# Patient Record
Sex: FEMALE | Race: WHITE | Employment: FULL TIME | URBAN - METROPOLITAN AREA
[De-identification: names, ages, dates, MRNs, and addresses within clinical notes are randomized per-mention and may not be internally consistent; named-entity substitution may affect disease eponyms.]

---

## 2017-06-26 ENCOUNTER — APPOINTMENT (OUTPATIENT)
Dept: LAB | Age: 52
End: 2017-06-26
Attending: INTERNAL MEDICINE
Payer: COMMERCIAL

## 2017-06-26 PROCEDURE — 80053 COMPREHEN METABOLIC PANEL: CPT | Performed by: INTERNAL MEDICINE

## 2017-06-26 PROCEDURE — 36415 COLL VENOUS BLD VENIPUNCTURE: CPT | Performed by: INTERNAL MEDICINE

## 2017-06-26 PROCEDURE — 83615 LACTATE (LD) (LDH) ENZYME: CPT | Performed by: INTERNAL MEDICINE

## 2017-06-26 PROCEDURE — 85025 COMPLETE CBC W/AUTO DIFF WBC: CPT | Performed by: INTERNAL MEDICINE

## 2017-06-29 ENCOUNTER — OFFICE VISIT (OUTPATIENT)
Dept: HEMATOLOGY/ONCOLOGY | Facility: HOSPITAL | Age: 52
End: 2017-06-29
Attending: INTERNAL MEDICINE
Payer: COMMERCIAL

## 2017-06-29 VITALS
SYSTOLIC BLOOD PRESSURE: 102 MMHG | HEIGHT: 62 IN | TEMPERATURE: 99 F | HEART RATE: 92 BPM | RESPIRATION RATE: 16 BRPM | WEIGHT: 172 LBS | BODY MASS INDEX: 31.65 KG/M2 | DIASTOLIC BLOOD PRESSURE: 46 MMHG

## 2017-06-29 DIAGNOSIS — C81.90 HODGKIN'S DISEASE, STAGE IIA (HCC): Primary | ICD-10-CM

## 2017-06-29 PROCEDURE — 99213 OFFICE O/P EST LOW 20 MIN: CPT | Performed by: INTERNAL MEDICINE

## 2017-06-29 PROCEDURE — 99212 OFFICE O/P EST SF 10 MIN: CPT | Performed by: INTERNAL MEDICINE

## 2017-06-29 RX ORDER — ROSUVASTATIN CALCIUM 10 MG/1
10 TABLET, COATED ORAL NIGHTLY
COMMUNITY

## 2017-06-29 NOTE — PROGRESS NOTES
GAMALIEL   Yaz Perez is a 47year old female here for f/u of Hodgkin's disease, stage iia (hcc)  (primary encounter diagnosis)      Pt here for follow up. States she feels good. Weight loss of 7 lbs. Denies pain or discomfort.      Denies B s daily. Disp:  Rfl: 3     Allergies:   No Known Allergies    Past Medical History:   Diagnosis Date   • High cholesterol    • History of pregnancy        • Hodgkin's lymphoma ([facility] [address]      Past Surgical History:  No date:     Social History  S murmur heard. Pulmonary/Chest: Effort normal and breath sounds normal. No respiratory distress. She has no wheezes. Port not tender at L chest.    Abdominal: Soft. Bowel sounds are normal. She exhibits no distension and no mass. There is no tenderness. per PCP. No orders of the defined types were placed in this encounter. Results From Past 48 Hours:  No results found for this or any previous visit (from the past 48 hour(s)).      Component      Latest Ref Rng & Units 6/26/2017 12/29/2016   Glucose 143 146       Imaging & Referrals:  None   No orders of the defined types were placed in this encounter.

## 2017-12-26 ENCOUNTER — LAB ENCOUNTER (OUTPATIENT)
Dept: LAB | Age: 52
End: 2017-12-26
Attending: INTERNAL MEDICINE
Payer: COMMERCIAL

## 2017-12-26 DIAGNOSIS — C81.90 HODGKIN'S DISEASE, STAGE IIA (HCC): ICD-10-CM

## 2017-12-26 PROCEDURE — 36415 COLL VENOUS BLD VENIPUNCTURE: CPT

## 2017-12-26 PROCEDURE — 83615 LACTATE (LD) (LDH) ENZYME: CPT

## 2017-12-26 PROCEDURE — 85025 COMPLETE CBC W/AUTO DIFF WBC: CPT

## 2017-12-26 PROCEDURE — 85652 RBC SED RATE AUTOMATED: CPT

## 2017-12-26 PROCEDURE — 80053 COMPREHEN METABOLIC PANEL: CPT

## 2017-12-28 ENCOUNTER — OFFICE VISIT (OUTPATIENT)
Dept: HEMATOLOGY/ONCOLOGY | Facility: HOSPITAL | Age: 52
End: 2017-12-28
Attending: INTERNAL MEDICINE
Payer: COMMERCIAL

## 2017-12-28 VITALS
HEART RATE: 80 BPM | RESPIRATION RATE: 16 BRPM | DIASTOLIC BLOOD PRESSURE: 67 MMHG | WEIGHT: 171 LBS | BODY MASS INDEX: 31.47 KG/M2 | TEMPERATURE: 97 F | HEIGHT: 62 IN | SYSTOLIC BLOOD PRESSURE: 127 MMHG

## 2017-12-28 DIAGNOSIS — C81.90 HODGKIN'S DISEASE, STAGE IIA (HCC): Primary | ICD-10-CM

## 2017-12-28 PROCEDURE — 99212 OFFICE O/P EST SF 10 MIN: CPT | Performed by: INTERNAL MEDICINE

## 2017-12-28 PROCEDURE — 99214 OFFICE O/P EST MOD 30 MIN: CPT | Performed by: INTERNAL MEDICINE

## 2017-12-28 NOTE — PROGRESS NOTES
HPI     Wicho Harrington is a 47year old female here for f/u of Hodgkin's disease, stage iia (hcc)  (primary encounter diagnosis)      Pt here for follow up. States she feels good. Weight stable. Denies pain or discomfort.      Denies B sy depression           Current Outpatient Prescriptions:  Rosuvastatin Calcium 10 MG Oral Tab Take 10 mg by mouth nightly. Disp:  Rfl:    SYNTHROID 88 MCG Oral Tab Take 88 mcg by mouth once daily.  Disp:  Rfl: 3     Allergies:   No Known Allergies    Past Med reactive to light. No scleral icterus. Neck: Normal range of motion. Neck supple. No tracheal deviation present. No thyromegaly present. Cardiovascular: Normal rate, regular rhythm and normal heart sounds. No murmur heard.   Pulmonary/Chest: Effort n once during the first 12 months, then clinically indicated.  Done June 2016 with CARMINE. Current TSH elevated, recommend evaluation by PCP for management of hypothyroid. Colonoscopy due August of 2021. Mammograms per PCP yearly.     All questions answ % 33 %   Monocyte % 10 %   Eosinophil % 3 %   Basophil % 1 %   Neutrophil Absolute 3.3 1.8 - 7.7 K/UL   Lymphocyte Absolute 2.0 1.0 - 4.0 K/UL   Monocyte Absolute 0.6 0.0 - 1.0 K/UL   Eosinophil Absolute 0.2 0.0 - 0.7 K/UL   Basophil Absolute 0.0 0.0 - 0.2

## 2018-06-25 ENCOUNTER — LAB ENCOUNTER (OUTPATIENT)
Dept: LAB | Facility: HOSPITAL | Age: 53
End: 2018-06-25
Attending: INTERNAL MEDICINE
Payer: COMMERCIAL

## 2018-06-25 DIAGNOSIS — C81.90 HODGKIN'S DISEASE, STAGE IIA (HCC): ICD-10-CM

## 2018-06-25 PROCEDURE — 36415 COLL VENOUS BLD VENIPUNCTURE: CPT

## 2018-06-25 PROCEDURE — 83615 LACTATE (LD) (LDH) ENZYME: CPT

## 2018-06-25 PROCEDURE — 84443 ASSAY THYROID STIM HORMONE: CPT

## 2018-06-25 PROCEDURE — 85652 RBC SED RATE AUTOMATED: CPT

## 2018-06-25 PROCEDURE — 85025 COMPLETE CBC W/AUTO DIFF WBC: CPT

## 2018-06-25 PROCEDURE — 80053 COMPREHEN METABOLIC PANEL: CPT

## 2018-06-27 DIAGNOSIS — C81.90 HODGKIN'S DISEASE, STAGE IIA (HCC): Primary | ICD-10-CM

## 2018-06-28 ENCOUNTER — OFFICE VISIT (OUTPATIENT)
Dept: HEMATOLOGY/ONCOLOGY | Facility: HOSPITAL | Age: 53
End: 2018-06-28
Attending: INTERNAL MEDICINE
Payer: COMMERCIAL

## 2018-06-28 VITALS
WEIGHT: 167 LBS | HEART RATE: 80 BPM | HEIGHT: 62 IN | RESPIRATION RATE: 16 BRPM | TEMPERATURE: 98 F | DIASTOLIC BLOOD PRESSURE: 70 MMHG | SYSTOLIC BLOOD PRESSURE: 113 MMHG | BODY MASS INDEX: 30.73 KG/M2

## 2018-06-28 DIAGNOSIS — C81.90 HODGKIN'S DISEASE, STAGE IIA (HCC): Primary | ICD-10-CM

## 2018-06-28 PROCEDURE — 99213 OFFICE O/P EST LOW 20 MIN: CPT | Performed by: INTERNAL MEDICINE

## 2018-06-28 NOTE — PROGRESS NOTES
GAMALIEL     Juli Julio is a 46year old female here for f/u of Hodgkin's disease, stage iia (hcc)  (primary encounter diagnosis)      Pt here for follow up. States she feels good. Weight stable. Denies pain or discomfort.      Denies B sy Current Outpatient Prescriptions:  Rosuvastatin Calcium 10 MG Oral Tab Take 10 mg by mouth nightly. Disp:  Rfl:    SYNTHROID 88 MCG Oral Tab Take 88 mcg by mouth once daily.  Disp:  Rfl: 3     Allergies:   No Known Allergies    Past Medical Histor No scleral icterus. Neck: Normal range of motion. Neck supple. No tracheal deviation present. No thyromegaly present. Cardiovascular: Normal rate, regular rhythm and normal heart sounds. No murmur heard.   Pulmonary/Chest: Effort normal and breath so management of hypothyroid. CT scan once during the first 12 months, then clinically indicated.  Done June 2016 with CARMINE. Colonoscopy due August of 2021. Mammograms per PCP yearly. F/u in 1 yr with CBC,CMP, LDH and ESR.     All questions answered Neutrophils %      % 52   Lymphocytes %      % 35   Monocytes %      % 9   Eosinophils %      % 3   Basophils %      % 1   Neutrophils Absolute      1.8 - 7.7 K/UL 3.2   Lymphocytes Absolute      1.0 - 4.0 K/UL 2.1   Monocytes Absolute      0.0 - 1.0 K/U

## 2019-06-27 ENCOUNTER — APPOINTMENT (OUTPATIENT)
Dept: HEMATOLOGY/ONCOLOGY | Facility: HOSPITAL | Age: 54
End: 2019-06-27
Attending: INTERNAL MEDICINE
Payer: COMMERCIAL

## 2019-07-15 ENCOUNTER — TELEPHONE (OUTPATIENT)
Dept: HEMATOLOGY/ONCOLOGY | Facility: HOSPITAL | Age: 54
End: 2019-07-15

## 2019-07-15 DIAGNOSIS — C81.90 HODGKIN'S DISEASE, STAGE IIA (HCC): Primary | ICD-10-CM

## 2019-07-15 NOTE — TELEPHONE ENCOUNTER
Pt called and left message and reminded to please have labs drawn prior to MD visit tomorrow. Instructed to call [phone] if has further questions or concerns.

## 2019-07-16 ENCOUNTER — OFFICE VISIT (OUTPATIENT)
Dept: HEMATOLOGY/ONCOLOGY | Facility: HOSPITAL | Age: 54
End: 2019-07-16
Attending: INTERNAL MEDICINE
Payer: COMMERCIAL

## 2019-07-16 ENCOUNTER — LAB ENCOUNTER (OUTPATIENT)
Dept: LAB | Facility: REFERENCE LAB | Age: 54
End: 2019-07-16
Attending: INTERNAL MEDICINE
Payer: COMMERCIAL

## 2019-07-16 VITALS
HEART RATE: 81 BPM | DIASTOLIC BLOOD PRESSURE: 79 MMHG | HEIGHT: 62 IN | SYSTOLIC BLOOD PRESSURE: 132 MMHG | OXYGEN SATURATION: 97 % | TEMPERATURE: 97 F | RESPIRATION RATE: 16 BRPM | BODY MASS INDEX: 30.51 KG/M2 | WEIGHT: 165.81 LBS

## 2019-07-16 DIAGNOSIS — C81.90 HODGKIN'S DISEASE, STAGE IIA (HCC): ICD-10-CM

## 2019-07-16 LAB
ALBUMIN SERPL-MCNC: 4 G/DL (ref 3.4–5)
ALBUMIN/GLOB SERPL: 1.1 {RATIO} (ref 1–2)
ALP LIVER SERPL-CCNC: 53 U/L (ref 41–108)
ALT SERPL-CCNC: 22 U/L (ref 13–56)
ANION GAP SERPL CALC-SCNC: 4 MMOL/L (ref 0–18)
AST SERPL-CCNC: 13 U/L (ref 15–37)
BASOPHILS # BLD AUTO: 0.03 X10(3) UL (ref 0–0.2)
BASOPHILS NFR BLD AUTO: 0.5 %
BILIRUB SERPL-MCNC: 0.4 MG/DL (ref 0.1–2)
BUN BLD-MCNC: 14 MG/DL (ref 7–18)
BUN/CREAT SERPL: 20.3 (ref 10–20)
CALCIUM BLD-MCNC: 9.6 MG/DL (ref 8.5–10.1)
CHLORIDE SERPL-SCNC: 106 MMOL/L (ref 98–112)
CO2 SERPL-SCNC: 30 MMOL/L (ref 21–32)
CREAT BLD-MCNC: 0.69 MG/DL (ref 0.55–1.02)
DEPRECATED RDW RBC AUTO: 41.9 FL (ref 35.1–46.3)
EOSINOPHIL # BLD AUTO: 0.17 X10(3) UL (ref 0–0.7)
EOSINOPHIL NFR BLD AUTO: 2.9 %
ERYTHROCYTE [DISTWIDTH] IN BLOOD BY AUTOMATED COUNT: 12.5 % (ref 11–15)
ERYTHROCYTE [SEDIMENTATION RATE] IN BLOOD: 6 MM/HR (ref 0–30)
GLOBULIN PLAS-MCNC: 3.5 G/DL (ref 2.8–4.4)
GLUCOSE BLD-MCNC: 107 MG/DL (ref 70–99)
HCT VFR BLD AUTO: 43.9 % (ref 35–48)
HGB BLD-MCNC: 14.1 G/DL (ref 12–16)
IMM GRANULOCYTES # BLD AUTO: 0 X10(3) UL (ref 0–1)
IMM GRANULOCYTES NFR BLD: 0 %
LDH SERPL L TO P-CCNC: 147 U/L (ref 84–246)
LYMPHOCYTES # BLD AUTO: 1.63 X10(3) UL (ref 1–4)
LYMPHOCYTES NFR BLD AUTO: 28 %
M PROTEIN MFR SERPL ELPH: 7.5 G/DL (ref 6.4–8.2)
MCH RBC QN AUTO: 29.3 PG (ref 26–34)
MCHC RBC AUTO-ENTMCNC: 32.1 G/DL (ref 31–37)
MCV RBC AUTO: 91.1 FL (ref 80–100)
MONOCYTES # BLD AUTO: 0.47 X10(3) UL (ref 0.1–1)
MONOCYTES NFR BLD AUTO: 8.1 %
NEUTROPHILS # BLD AUTO: 3.52 X10 (3) UL (ref 1.5–7.7)
NEUTROPHILS # BLD AUTO: 3.52 X10(3) UL (ref 1.5–7.7)
NEUTROPHILS NFR BLD AUTO: 60.5 %
OSMOLALITY SERPL CALC.SUM OF ELEC: 291 MOSM/KG (ref 275–295)
PATIENT FASTING: YES
PLATELET # BLD AUTO: 228 10(3)UL (ref 150–450)
POTASSIUM SERPL-SCNC: 4.9 MMOL/L (ref 3.5–5.1)
RBC # BLD AUTO: 4.82 X10(6)UL (ref 3.8–5.3)
SODIUM SERPL-SCNC: 140 MMOL/L (ref 136–145)
TSI SER-ACNC: 1.19 MIU/ML (ref 0.36–3.74)
WBC # BLD AUTO: 5.8 X10(3) UL (ref 4–11)

## 2019-07-16 PROCEDURE — 36415 COLL VENOUS BLD VENIPUNCTURE: CPT

## 2019-07-16 PROCEDURE — 85025 COMPLETE CBC W/AUTO DIFF WBC: CPT

## 2019-07-16 PROCEDURE — 84443 ASSAY THYROID STIM HORMONE: CPT

## 2019-07-16 PROCEDURE — 85652 RBC SED RATE AUTOMATED: CPT

## 2019-07-16 PROCEDURE — 83615 LACTATE (LD) (LDH) ENZYME: CPT

## 2019-07-16 PROCEDURE — 99213 OFFICE O/P EST LOW 20 MIN: CPT | Performed by: INTERNAL MEDICINE

## 2019-07-16 PROCEDURE — 80053 COMPREHEN METABOLIC PANEL: CPT

## 2019-07-16 NOTE — PROGRESS NOTES
HPI   Renny Frazier is a 53year old female here for f/u of Hodgkin's disease, stage iia (hcc)      Pt here for follow up. States she feels good. Weight down due to diet changes and also keeps active and goes for walks.      Denies pain o (Patient Position: Sitting, Cuff Size: adult)   Pulse 81   Temp 97.4 °F (36.3 °C) (Oral)   Resp 16   Ht 1.575 m (5' 2\")   Wt 75.2 kg (165 lb 12.8 oz)   SpO2 97%   BMI 30.33 kg/m²   Wt Readings from Last 6 Encounters:  07/16/19 : 75.2 kg (165 lb 12.8 oz) encounter.       Results From Past 48 Hours:  Recent Results (from the past 48 hour(s))   ASSAY, THYROID STIM HORMONE    Collection Time: 07/16/19  9:19 AM   Result Value Ref Range    TSH 1.190 0.358 - 3.740 mIU/mL   SED RATE, JEYSON (AUTOMATED)    Gurinder Absolute 0.00 0.00 - 1.00 x10(3) uL    Neutrophil % 60.5 %    Lymphocyte % 28.0 %    Monocyte % 8.1 %    Eosinophil % 2.9 %    Basophil % 0.5 %    Immature Granulocyte % 0.0 %     Imaging & Referrals:  None   No orders of the defined types were placed in t

## 2020-04-15 ENCOUNTER — TELEPHONE (OUTPATIENT)
Dept: OTOLARYNGOLOGY | Facility: CLINIC | Age: 55
End: 2020-04-15

## 2020-04-15 NOTE — TELEPHONE ENCOUNTER
Patient states her ears are very clogged with wax. It isn't clearing anymore. LOV 2015 for ear cleaning. She tried OT ear cleaning kit. She would like to be seen in office if possible or any recommendations for cleaning at home.

## 2020-04-20 ENCOUNTER — OFFICE VISIT (OUTPATIENT)
Dept: OTOLARYNGOLOGY | Facility: CLINIC | Age: 55
End: 2020-04-20
Payer: COMMERCIAL

## 2020-04-20 VITALS — DIASTOLIC BLOOD PRESSURE: 71 MMHG | SYSTOLIC BLOOD PRESSURE: 105 MMHG | HEART RATE: 84 BPM | TEMPERATURE: 98 F

## 2020-04-20 DIAGNOSIS — C81.90 HODGKIN'S DISEASE, STAGE IIA (HCC): ICD-10-CM

## 2020-04-20 DIAGNOSIS — H61.23 BILATERAL IMPACTED CERUMEN: Primary | ICD-10-CM

## 2020-04-20 PROCEDURE — 99202 OFFICE O/P NEW SF 15 MIN: CPT | Performed by: OTOLARYNGOLOGY

## 2020-04-20 NOTE — PROGRESS NOTES
Danyell Woosd is a 63year old female. Patient presents with:  Ear Wax      HISTORY OF PRESENT ILLNESS  7/15 History of Hodgkins lymphoma diagnosed with core biopsy. S/P 4 cycles of chemo and just finished IMRT in mid June. Doing very well.  No com Procedure Laterality Date   •            REVIEW OF SYSTEMS    System Neg/Pos Details   Constitutional Negative Fatigue, fever and weight loss. ENMT Negative Drooling. Eyes Negative Blurred vision and vision changes.    Respiratory Negative Dy Normal.   Microscopy  Binocular microscopy was performed. The affected ear(s) was/were examined and all debris removed using suction. The findings are described in the physical exam.   Both ears cleared of all debris using suction and microscopy.   Hearing

## 2020-07-16 ENCOUNTER — OFFICE VISIT (OUTPATIENT)
Dept: HEMATOLOGY/ONCOLOGY | Facility: HOSPITAL | Age: 55
End: 2020-07-16
Attending: INTERNAL MEDICINE
Payer: COMMERCIAL

## 2020-07-16 VITALS
HEIGHT: 62 IN | BODY MASS INDEX: 32.02 KG/M2 | SYSTOLIC BLOOD PRESSURE: 134 MMHG | OXYGEN SATURATION: 97 % | RESPIRATION RATE: 18 BRPM | TEMPERATURE: 99 F | HEART RATE: 79 BPM | WEIGHT: 174 LBS | DIASTOLIC BLOOD PRESSURE: 75 MMHG

## 2020-07-16 DIAGNOSIS — C81.90 HODGKIN'S DISEASE, STAGE IIA (HCC): Primary | ICD-10-CM

## 2020-07-16 PROCEDURE — 99213 OFFICE O/P EST LOW 20 MIN: CPT | Performed by: INTERNAL MEDICINE

## 2020-07-16 NOTE — PROGRESS NOTES
HPI   Gisela Guerrero is a 63year old female here for f/u of Hodgkin's disease, stage iia (hcc)  (primary encounter diagnosis)      Pt here for follow up. States she feels good.      Weight down due to diet changes and also keeps active and go Brother          PHYSICAL EXAM:    /75 (BP Location: Left arm, Patient Position: Sitting, Cuff Size: adult)   Pulse 79   Temp 98.5 °F (36.9 °C) (Oral)   Resp 18   Ht 1.575 m (5' 2\")   Wt 78.9 kg (174 lb)   SpO2 97%   BMI 31.83 kg/m²   Wt Readings fr provided to the patient. No orders of the defined types were placed in this encounter. Results From Past 48 Hours:  No results found for this or any previous visit (from the past 48 hour(s)).     Component      Latest Ref Rng & Units 7/16/2019 A/G Ratio      1.0 - 2.0 1.1   Patient Fasting? Yes   TSH      0.358 - 3.740 mIU/mL 1.190   SED RATE      0 - 30 mm/Hr 6   LDH      84 - 246 U/L 147         Imaging & Referrals:  None   No orders of the defined types were placed in this encounter.

## 2021-07-16 ENCOUNTER — TELEPHONE (OUTPATIENT)
Dept: HEMATOLOGY/ONCOLOGY | Facility: HOSPITAL | Age: 56
End: 2021-07-16

## 2021-07-16 DIAGNOSIS — C81.90 HODGKIN'S DISEASE, STAGE IIA (HCC): Primary | ICD-10-CM

## 2021-07-16 NOTE — TELEPHONE ENCOUNTER
Patient called and left message added a lab appointment at [facility] 8:15 am 7/19/21 prior to seeing Dr. Aaliyah Piña.

## 2021-07-18 PROBLEM — Z85.71 HISTORY OF HODGKIN'S DISEASE: Status: ACTIVE | Noted: 2021-07-18

## 2021-07-18 PROBLEM — Z08 ENCOUNTER FOR FOLLOW-UP SURVEILLANCE OF HODGKIN'S DISEASE: Status: ACTIVE | Noted: 2021-07-18

## 2021-07-18 PROBLEM — Z85.71 ENCOUNTER FOR FOLLOW-UP SURVEILLANCE OF HODGKIN'S DISEASE: Status: ACTIVE | Noted: 2021-07-18

## 2021-07-19 ENCOUNTER — NURSE ONLY (OUTPATIENT)
Dept: HEMATOLOGY/ONCOLOGY | Facility: HOSPITAL | Age: 56
End: 2021-07-19
Attending: INTERNAL MEDICINE
Payer: COMMERCIAL

## 2021-07-19 VITALS
SYSTOLIC BLOOD PRESSURE: 148 MMHG | BODY MASS INDEX: 33.25 KG/M2 | WEIGHT: 183 LBS | HEIGHT: 62.01 IN | DIASTOLIC BLOOD PRESSURE: 80 MMHG | RESPIRATION RATE: 16 BRPM | TEMPERATURE: 98 F | HEART RATE: 83 BPM | OXYGEN SATURATION: 96 %

## 2021-07-19 DIAGNOSIS — Z85.71 ENCOUNTER FOR FOLLOW-UP SURVEILLANCE OF HODGKIN'S DISEASE: ICD-10-CM

## 2021-07-19 DIAGNOSIS — Z08 ENCOUNTER FOR FOLLOW-UP SURVEILLANCE OF HODGKIN'S DISEASE: ICD-10-CM

## 2021-07-19 DIAGNOSIS — Z85.71 HISTORY OF HODGKIN'S DISEASE: Primary | ICD-10-CM

## 2021-07-19 DIAGNOSIS — C81.90 HODGKIN'S DISEASE, STAGE IIA (HCC): ICD-10-CM

## 2021-07-19 LAB
ERYTHROCYTE [SEDIMENTATION RATE] IN BLOOD: 9 MM/HR
LDH SERPL L TO P-CCNC: 174 U/L

## 2021-07-19 PROCEDURE — 99213 OFFICE O/P EST LOW 20 MIN: CPT | Performed by: INTERNAL MEDICINE

## 2021-07-19 PROCEDURE — 85652 RBC SED RATE AUTOMATED: CPT

## 2021-07-19 PROCEDURE — 36415 COLL VENOUS BLD VENIPUNCTURE: CPT

## 2021-07-19 PROCEDURE — 83615 LACTATE (LD) (LDH) ENZYME: CPT

## 2021-07-19 NOTE — PROGRESS NOTES
GAMALIEL Mcconnell Manuel is a 61year old female here for f/u of History of hodgkin's disease  (primary encounter diagnosis)  Encounter for follow-up surveillance of hodgkin's disease    States she feels good. Has gained weight this year.     Winston Para (36.7 °C) (Oral)   Resp 16   Ht 1.575 m (5' 2.01\")   Wt 83 kg (183 lb)   SpO2 96%   BMI 33.46 kg/m²   Wt Readings from Last 6 Encounters:  07/19/21 : 83 kg (183 lb)  07/16/20 : 78.9 kg (174 lb)  07/16/19 : 75.2 kg (165 lb 12.8 oz)  06/28/18 : 75.8 kg (167 MDM low  No orders of the defined types were placed in this encounter. Results From Past 48 Hours:  No results found for this or any previous visit (from the past 48 hour(s)).     Imaging & Referrals:  None   No orders of the defined types were amy THE eGFR SHOULD NOT BE USED TO CALCULATE PATIENT MEDICATION DOSES. *  *  *  *   THE eGFR IS NOT RECOMMENDED FOR USE IN PREGNANT WOMEN.        Resulting Agency [facility]    Specimen Collected: 07/15/21  1:37 PM Last Resulted: 07/16/21

## 2021-07-21 ENCOUNTER — TELEPHONE (OUTPATIENT)
Dept: HEMATOLOGY/ONCOLOGY | Facility: HOSPITAL | Age: 56
End: 2021-07-21

## 2021-07-21 ENCOUNTER — TELEPHONE (OUTPATIENT)
Dept: GASTROENTEROLOGY | Facility: CLINIC | Age: 56
End: 2021-07-21

## 2021-07-21 DIAGNOSIS — Z86.010 PERSONAL HISTORY OF COLONIC POLYPS: Primary | ICD-10-CM

## 2021-07-21 NOTE — TELEPHONE ENCOUNTER
The patient's chart has been reviewed. Okay to schedule pt for 5 year CLN recall r/t hx colon polyps with Dr. Eric Rueda. Advise MAC sedation r/t BMI with split dose MiraLAX/Gatorade (related to national bowel prep shortage.   Patient has no history of const

## 2021-07-21 NOTE — TELEPHONE ENCOUNTER
Last Procedure, Date, MD:  Colonoscopy Dr. Flynn Mederos 8/16/2016  Last Diagnosis: No masses or polyps noted, diverticular disease in sigmoid colon, internal hemorrhoids  Recalled for (mth/yrs): 5 years  Sedation used previously:  MAC  Last Prep Used (if known):

## 2021-07-21 NOTE — TELEPHONE ENCOUNTER
Returned call. Per patient Dr. Jered Lama office calling back and will answer their call to schedule appointment.

## 2021-07-21 NOTE — TELEPHONE ENCOUNTER
Patient doesn't want to come in for Office Visit - requesting to schedule CLN. Please call. Thank you.

## 2021-07-21 NOTE — TELEPHONE ENCOUNTER
Patient calling asking if dr Bard Cedillo would write a order colonoscopy for her. She is having difficulty scheduling with dr lora's off because 2016 procedure is missing.  melly

## 2021-07-31 NOTE — TELEPHONE ENCOUNTER
CBLM to schedule procedure.  Please transfer to [facility] in GI

## 2021-08-06 NOTE — TELEPHONE ENCOUNTER
Scheduled for:  Colonoscopy [id number]  Provider Name:  Dr. Lyn Stahl  Date:  10/04/2021  Location:  [facility]  Sedation:  MAC  Time:  9:00am, (pt is aware to arrive at 8:00am)    Prep:  Split MiraLAX + Gatorade   Meds/Allergies Reconciled?:  Jammie/ADWOA reviewed.       D

## 2021-09-30 ENCOUNTER — TELEPHONE (OUTPATIENT)
Dept: GASTROENTEROLOGY | Facility: CLINIC | Age: 56
End: 2021-09-30

## 2021-09-30 NOTE — TELEPHONE ENCOUNTER
Per Endo PAT/RN--    Patient states she needs to cancel procedure. Instructed patient to call office when ready to reschedule.      Cancelling for:  Colonoscopy [id number]  Provider Name:  Dr. Milagros Umana  Date:  10/04/2021  Location:  [facility]  Sedation:  MAC  Time:  079

## 2022-07-12 ENCOUNTER — OFFICE VISIT (OUTPATIENT)
Dept: HEMATOLOGY/ONCOLOGY | Facility: HOSPITAL | Age: 57
End: 2022-07-12
Attending: INTERNAL MEDICINE
Payer: COMMERCIAL

## 2022-07-12 VITALS
WEIGHT: 183 LBS | HEART RATE: 92 BPM | HEIGHT: 62.01 IN | DIASTOLIC BLOOD PRESSURE: 55 MMHG | TEMPERATURE: 99 F | SYSTOLIC BLOOD PRESSURE: 117 MMHG | RESPIRATION RATE: 16 BRPM | OXYGEN SATURATION: 98 % | BODY MASS INDEX: 33.25 KG/M2

## 2022-07-12 DIAGNOSIS — Z85.71 HISTORY OF HODGKIN'S DISEASE: Primary | ICD-10-CM

## 2022-07-12 DIAGNOSIS — Z08 ENCOUNTER FOR FOLLOW-UP SURVEILLANCE OF HODGKIN'S DISEASE: ICD-10-CM

## 2022-07-12 DIAGNOSIS — Z85.71 ENCOUNTER FOR FOLLOW-UP SURVEILLANCE OF HODGKIN'S DISEASE: ICD-10-CM

## 2022-07-12 PROCEDURE — 99213 OFFICE O/P EST LOW 20 MIN: CPT | Performed by: INTERNAL MEDICINE

## 2022-07-18 ENCOUNTER — APPOINTMENT (OUTPATIENT)
Dept: HEMATOLOGY/ONCOLOGY | Facility: HOSPITAL | Age: 57
End: 2022-07-18
Attending: INTERNAL MEDICINE
Payer: COMMERCIAL

## 2022-09-09 ENCOUNTER — LAB ENCOUNTER (OUTPATIENT)
Dept: LAB | Age: 57
End: 2022-09-09
Attending: INTERNAL MEDICINE
Payer: COMMERCIAL

## 2022-09-09 DIAGNOSIS — Z85.71 ENCOUNTER FOR FOLLOW-UP SURVEILLANCE OF HODGKIN'S DISEASE: ICD-10-CM

## 2022-09-09 DIAGNOSIS — Z85.71 HISTORY OF HODGKIN'S DISEASE: ICD-10-CM

## 2022-09-09 DIAGNOSIS — Z08 ENCOUNTER FOR FOLLOW-UP SURVEILLANCE OF HODGKIN'S DISEASE: ICD-10-CM

## 2022-09-09 LAB
ALBUMIN SERPL-MCNC: 3.9 G/DL (ref 3.4–5)
ALBUMIN/GLOB SERPL: 1.1 {RATIO} (ref 1–2)
ALP LIVER SERPL-CCNC: 59 U/L
ALT SERPL-CCNC: 35 U/L
ANION GAP SERPL CALC-SCNC: 10 MMOL/L (ref 0–18)
AST SERPL-CCNC: 17 U/L (ref 15–37)
BASOPHILS # BLD AUTO: 0.03 X10(3) UL (ref 0–0.2)
BASOPHILS NFR BLD AUTO: 0.5 %
BILIRUB SERPL-MCNC: 0.4 MG/DL (ref 0.1–2)
BUN BLD-MCNC: 13 MG/DL (ref 7–18)
BUN/CREAT SERPL: 20.3 (ref 10–20)
CALCIUM BLD-MCNC: 9.3 MG/DL (ref 8.5–10.1)
CHLORIDE SERPL-SCNC: 105 MMOL/L (ref 98–112)
CO2 SERPL-SCNC: 27 MMOL/L (ref 21–32)
CREAT BLD-MCNC: 0.64 MG/DL
DEPRECATED RDW RBC AUTO: 43.5 FL (ref 35.1–46.3)
EOSINOPHIL # BLD AUTO: 0.12 X10(3) UL (ref 0–0.7)
EOSINOPHIL NFR BLD AUTO: 2 %
ERYTHROCYTE [DISTWIDTH] IN BLOOD BY AUTOMATED COUNT: 12.7 % (ref 11–15)
ERYTHROCYTE [SEDIMENTATION RATE] IN BLOOD: 11 MM/HR
FASTING STATUS PATIENT QL REPORTED: YES
GFR SERPLBLD BASED ON 1.73 SQ M-ARVRAT: 102 ML/MIN/1.73M2 (ref 60–?)
GLOBULIN PLAS-MCNC: 3.4 G/DL (ref 2.8–4.4)
GLUCOSE BLD-MCNC: 103 MG/DL (ref 70–99)
HCT VFR BLD AUTO: 42.6 %
HGB BLD-MCNC: 13.3 G/DL
IMM GRANULOCYTES # BLD AUTO: 0.01 X10(3) UL (ref 0–1)
IMM GRANULOCYTES NFR BLD: 0.2 %
LDH SERPL L TO P-CCNC: 158 U/L
LYMPHOCYTES # BLD AUTO: 1.96 X10(3) UL (ref 1–4)
LYMPHOCYTES NFR BLD AUTO: 32.3 %
MCH RBC QN AUTO: 29.1 PG (ref 26–34)
MCHC RBC AUTO-ENTMCNC: 31.2 G/DL (ref 31–37)
MCV RBC AUTO: 93.2 FL
MONOCYTES # BLD AUTO: 0.51 X10(3) UL (ref 0.1–1)
MONOCYTES NFR BLD AUTO: 8.4 %
NEUTROPHILS # BLD AUTO: 3.43 X10 (3) UL (ref 1.5–7.7)
NEUTROPHILS # BLD AUTO: 3.43 X10(3) UL (ref 1.5–7.7)
NEUTROPHILS NFR BLD AUTO: 56.6 %
OSMOLALITY SERPL CALC.SUM OF ELEC: 294 MOSM/KG (ref 275–295)
PLATELET # BLD AUTO: 252 10(3)UL (ref 150–450)
POTASSIUM SERPL-SCNC: 4.5 MMOL/L (ref 3.5–5.1)
PROT SERPL-MCNC: 7.3 G/DL (ref 6.4–8.2)
RBC # BLD AUTO: 4.57 X10(6)UL
SODIUM SERPL-SCNC: 142 MMOL/L (ref 136–145)
WBC # BLD AUTO: 6.1 X10(3) UL (ref 4–11)

## 2022-09-09 PROCEDURE — 80053 COMPREHEN METABOLIC PANEL: CPT

## 2022-09-09 PROCEDURE — 85025 COMPLETE CBC W/AUTO DIFF WBC: CPT

## 2022-09-09 PROCEDURE — 83615 LACTATE (LD) (LDH) ENZYME: CPT

## 2022-09-09 PROCEDURE — 36415 COLL VENOUS BLD VENIPUNCTURE: CPT

## 2022-09-09 PROCEDURE — 85652 RBC SED RATE AUTOMATED: CPT

## 2023-02-08 DIAGNOSIS — C81.90 HODGKIN'S DISEASE, STAGE IIA (HCC): Primary | ICD-10-CM

## 2023-02-09 ENCOUNTER — NURSE ONLY (OUTPATIENT)
Dept: HEMATOLOGY/ONCOLOGY | Facility: HOSPITAL | Age: 58
End: 2023-02-09
Attending: INTERNAL MEDICINE
Payer: COMMERCIAL

## 2023-02-09 VITALS
RESPIRATION RATE: 16 BRPM | TEMPERATURE: 98 F | SYSTOLIC BLOOD PRESSURE: 139 MMHG | DIASTOLIC BLOOD PRESSURE: 84 MMHG | OXYGEN SATURATION: 97 % | HEIGHT: 62 IN | WEIGHT: 183 LBS | BODY MASS INDEX: 33.68 KG/M2 | HEART RATE: 100 BPM

## 2023-02-09 DIAGNOSIS — Z08 ENCOUNTER FOR FOLLOW-UP SURVEILLANCE OF HODGKIN'S DISEASE: ICD-10-CM

## 2023-02-09 DIAGNOSIS — C81.90 HODGKIN'S DISEASE, STAGE IIA (HCC): ICD-10-CM

## 2023-02-09 DIAGNOSIS — Z85.71 ENCOUNTER FOR FOLLOW-UP SURVEILLANCE OF HODGKIN'S DISEASE: ICD-10-CM

## 2023-02-09 DIAGNOSIS — Z85.71 HISTORY OF HODGKIN'S DISEASE: Primary | ICD-10-CM

## 2023-02-09 DIAGNOSIS — R01.1 SYSTOLIC EJECTION MURMUR: ICD-10-CM

## 2023-02-09 LAB
ALBUMIN SERPL-MCNC: 4 G/DL (ref 3.4–5)
ALBUMIN/GLOB SERPL: 1.1 {RATIO} (ref 1–2)
ALP LIVER SERPL-CCNC: 68 U/L
ALT SERPL-CCNC: 30 U/L
ANION GAP SERPL CALC-SCNC: 3 MMOL/L (ref 0–18)
AST SERPL-CCNC: 16 U/L (ref 15–37)
BASOPHILS # BLD AUTO: 0.03 X10(3) UL (ref 0–0.2)
BASOPHILS NFR BLD AUTO: 0.4 %
BILIRUB SERPL-MCNC: 0.2 MG/DL (ref 0.1–2)
BUN BLD-MCNC: 15 MG/DL (ref 7–18)
BUN/CREAT SERPL: 17.9 (ref 10–20)
CALCIUM BLD-MCNC: 9.6 MG/DL (ref 8.5–10.1)
CHLORIDE SERPL-SCNC: 107 MMOL/L (ref 98–112)
CO2 SERPL-SCNC: 32 MMOL/L (ref 21–32)
CREAT BLD-MCNC: 0.84 MG/DL
DEPRECATED RDW RBC AUTO: 40.6 FL (ref 35.1–46.3)
EOSINOPHIL # BLD AUTO: 0.18 X10(3) UL (ref 0–0.7)
EOSINOPHIL NFR BLD AUTO: 2.1 %
ERYTHROCYTE [DISTWIDTH] IN BLOOD BY AUTOMATED COUNT: 12.6 % (ref 11–15)
ERYTHROCYTE [SEDIMENTATION RATE] IN BLOOD: 9 MM/HR
GFR SERPLBLD BASED ON 1.73 SQ M-ARVRAT: 80 ML/MIN/1.73M2 (ref 60–?)
GLOBULIN PLAS-MCNC: 3.6 G/DL (ref 2.8–4.4)
GLUCOSE BLD-MCNC: 114 MG/DL (ref 70–99)
HCT VFR BLD AUTO: 40.5 %
HGB BLD-MCNC: 13.6 G/DL
IMM GRANULOCYTES # BLD AUTO: 0.02 X10(3) UL (ref 0–1)
IMM GRANULOCYTES NFR BLD: 0.2 %
LDH SERPL L TO P-CCNC: 183 U/L
LYMPHOCYTES # BLD AUTO: 2.19 X10(3) UL (ref 1–4)
LYMPHOCYTES NFR BLD AUTO: 25.8 %
MCH RBC QN AUTO: 29.6 PG (ref 26–34)
MCHC RBC AUTO-ENTMCNC: 33.6 G/DL (ref 31–37)
MCV RBC AUTO: 88.2 FL
MONOCYTES # BLD AUTO: 0.63 X10(3) UL (ref 0.1–1)
MONOCYTES NFR BLD AUTO: 7.4 %
NEUTROPHILS # BLD AUTO: 5.45 X10 (3) UL (ref 1.5–7.7)
NEUTROPHILS # BLD AUTO: 5.45 X10(3) UL (ref 1.5–7.7)
NEUTROPHILS NFR BLD AUTO: 64.1 %
OSMOLALITY SERPL CALC.SUM OF ELEC: 296 MOSM/KG (ref 275–295)
PLATELET # BLD AUTO: 266 10(3)UL (ref 150–450)
POTASSIUM SERPL-SCNC: 4.6 MMOL/L (ref 3.5–5.1)
PROT SERPL-MCNC: 7.6 G/DL (ref 6.4–8.2)
RBC # BLD AUTO: 4.59 X10(6)UL
SODIUM SERPL-SCNC: 142 MMOL/L (ref 136–145)
WBC # BLD AUTO: 8.5 X10(3) UL (ref 4–11)

## 2023-02-09 PROCEDURE — 80053 COMPREHEN METABOLIC PANEL: CPT

## 2023-02-09 PROCEDURE — 85025 COMPLETE CBC W/AUTO DIFF WBC: CPT

## 2023-02-09 PROCEDURE — 85652 RBC SED RATE AUTOMATED: CPT

## 2023-02-09 PROCEDURE — 36415 COLL VENOUS BLD VENIPUNCTURE: CPT

## 2023-02-09 PROCEDURE — 83615 LACTATE (LD) (LDH) ENZYME: CPT

## 2023-02-09 PROCEDURE — 99214 OFFICE O/P EST MOD 30 MIN: CPT | Performed by: INTERNAL MEDICINE

## 2023-02-13 ENCOUNTER — OFFICE VISIT (OUTPATIENT)
Dept: OTOLARYNGOLOGY | Facility: CLINIC | Age: 58
End: 2023-02-13

## 2023-02-13 DIAGNOSIS — H92.01 RIGHT EAR PAIN: Primary | ICD-10-CM

## 2023-02-13 RX ORDER — CYCLOBENZAPRINE HCL 5 MG
5 TABLET ORAL NIGHTLY
Qty: 30 TABLET | Refills: 1 | Status: SHIPPED | OUTPATIENT
Start: 2023-02-13

## 2023-02-13 RX ORDER — CELECOXIB 200 MG/1
200 CAPSULE ORAL DAILY PRN
Qty: 30 CAPSULE | Refills: 0 | Status: SHIPPED | OUTPATIENT
Start: 2023-02-13 | End: 2023-03-15

## 2023-02-15 ENCOUNTER — HOSPITAL ENCOUNTER (OUTPATIENT)
Dept: CT IMAGING | Age: 58
Discharge: HOME | End: 2023-02-15
Attending: INTERNAL MEDICINE
Payer: COMMERCIAL

## 2023-02-15 DIAGNOSIS — Z85.71 HISTORY OF HODGKIN'S DISEASE: ICD-10-CM

## 2023-02-15 DIAGNOSIS — Z08 ENCOUNTER FOR FOLLOW-UP SURVEILLANCE OF HODGKIN'S DISEASE: ICD-10-CM

## 2023-02-15 DIAGNOSIS — Z85.71 ENCOUNTER FOR FOLLOW-UP SURVEILLANCE OF HODGKIN'S DISEASE: ICD-10-CM

## 2023-02-15 PROCEDURE — 70491 CT SOFT TISSUE NECK W/DYE: CPT | Performed by: INTERNAL MEDICINE

## 2023-02-16 NOTE — PROGRESS NOTES
Please let the patient know that no evidence of lymphoma. She has to follow up with her PCP regarding plaque in the carotids. Dr. Ion Shahid.

## 2023-02-17 ENCOUNTER — TELEPHONE (OUTPATIENT)
Dept: HEMATOLOGY/ONCOLOGY | Facility: HOSPITAL | Age: 58
End: 2023-02-17

## 2023-02-18 ENCOUNTER — HOSPITAL ENCOUNTER (OUTPATIENT)
Dept: CV DIAGNOSTICS | Facility: HOSPITAL | Age: 58
Discharge: HOME | End: 2023-02-18
Attending: INTERNAL MEDICINE
Payer: COMMERCIAL

## 2023-02-18 DIAGNOSIS — R01.1 SYSTOLIC EJECTION MURMUR: ICD-10-CM

## 2023-02-18 PROCEDURE — 93306 TTE W/DOPPLER COMPLETE: CPT | Performed by: INTERNAL MEDICINE

## 2023-03-16 RX ORDER — CELECOXIB 200 MG/1
CAPSULE ORAL
Qty: 30 CAPSULE | Refills: 0 | Status: SHIPPED | OUTPATIENT
Start: 2023-03-16

## 2023-04-19 RX ORDER — CELECOXIB 200 MG/1
CAPSULE ORAL
Qty: 30 CAPSULE | Refills: 0 | Status: SHIPPED | OUTPATIENT
Start: 2023-04-19

## 2023-07-10 ENCOUNTER — TELEPHONE (OUTPATIENT)
Dept: HEMATOLOGY/ONCOLOGY | Facility: HOSPITAL | Age: 58
End: 2023-07-10

## 2023-07-10 NOTE — TELEPHONE ENCOUNTER
Pt is calling to check if she should come in for this visit on 7/12 or if she should reschedule for next year in Feb. She would also like to know the test results from 2/09/23-NL

## 2023-07-10 NOTE — TELEPHONE ENCOUNTER
Returned phone call and follow up appointments scheduled for 7/19/23 lab and MD. Informed per Dr. Grant Humble scan was stable.

## 2023-07-12 ENCOUNTER — APPOINTMENT (OUTPATIENT)
Dept: HEMATOLOGY/ONCOLOGY | Facility: HOSPITAL | Age: 58
End: 2023-07-12
Attending: INTERNAL MEDICINE
Payer: COMMERCIAL

## 2024-02-16 DIAGNOSIS — C81.90 HODGKIN'S DISEASE, STAGE IIA (HCC): Primary | ICD-10-CM

## 2024-02-19 ENCOUNTER — NURSE ONLY (OUTPATIENT)
Dept: HEMATOLOGY/ONCOLOGY | Facility: HOSPITAL | Age: 59
End: 2024-02-19
Attending: INTERNAL MEDICINE
Payer: COMMERCIAL

## 2024-02-19 VITALS
RESPIRATION RATE: 18 BRPM | OXYGEN SATURATION: 97 % | HEIGHT: 62.01 IN | HEART RATE: 93 BPM | SYSTOLIC BLOOD PRESSURE: 120 MMHG | BODY MASS INDEX: 32.65 KG/M2 | WEIGHT: 179.69 LBS | TEMPERATURE: 98 F | DIASTOLIC BLOOD PRESSURE: 64 MMHG

## 2024-02-19 DIAGNOSIS — C81.90 HODGKIN'S DISEASE, STAGE IIA (HCC): ICD-10-CM

## 2024-02-19 DIAGNOSIS — Z85.71 HISTORY OF HODGKIN'S DISEASE: Primary | ICD-10-CM

## 2024-02-19 DIAGNOSIS — Z85.71 ENCOUNTER FOR FOLLOW-UP SURVEILLANCE OF HODGKIN'S DISEASE: ICD-10-CM

## 2024-02-19 DIAGNOSIS — Z08 ENCOUNTER FOR FOLLOW-UP SURVEILLANCE OF HODGKIN'S DISEASE: ICD-10-CM

## 2024-02-19 LAB
ALBUMIN SERPL-MCNC: 4.4 G/DL (ref 3.2–4.8)
ALBUMIN/GLOB SERPL: 1.5 {RATIO} (ref 1–2)
ALP LIVER SERPL-CCNC: 67 U/L
ALT SERPL-CCNC: 38 U/L
ANION GAP SERPL CALC-SCNC: 6 MMOL/L (ref 0–18)
AST SERPL-CCNC: 25 U/L (ref ?–34)
BASOPHILS # BLD AUTO: 0.04 X10(3) UL (ref 0–0.2)
BASOPHILS NFR BLD AUTO: 0.6 %
BILIRUB SERPL-MCNC: 0.5 MG/DL (ref 0.3–1.2)
BUN BLD-MCNC: 12 MG/DL (ref 9–23)
BUN/CREAT SERPL: 15 (ref 10–20)
CALCIUM BLD-MCNC: 9.8 MG/DL (ref 8.7–10.4)
CHLORIDE SERPL-SCNC: 106 MMOL/L (ref 98–112)
CO2 SERPL-SCNC: 30 MMOL/L (ref 21–32)
CREAT BLD-MCNC: 0.8 MG/DL
DEPRECATED RDW RBC AUTO: 41.5 FL (ref 35.1–46.3)
EGFRCR SERPLBLD CKD-EPI 2021: 85 ML/MIN/1.73M2 (ref 60–?)
EOSINOPHIL # BLD AUTO: 0.16 X10(3) UL (ref 0–0.7)
EOSINOPHIL NFR BLD AUTO: 2.3 %
ERYTHROCYTE [DISTWIDTH] IN BLOOD BY AUTOMATED COUNT: 12.6 % (ref 11–15)
ERYTHROCYTE [SEDIMENTATION RATE] IN BLOOD: 12 MM/HR
FASTING STATUS PATIENT QL REPORTED: NO
GLOBULIN PLAS-MCNC: 2.9 G/DL (ref 2.8–4.4)
GLUCOSE BLD-MCNC: 115 MG/DL (ref 70–99)
HCT VFR BLD AUTO: 42.7 %
HGB BLD-MCNC: 13.6 G/DL
IMM GRANULOCYTES # BLD AUTO: 0.01 X10(3) UL (ref 0–1)
IMM GRANULOCYTES NFR BLD: 0.1 %
LDH SERPL L TO P-CCNC: 169 U/L
LYMPHOCYTES # BLD AUTO: 2.42 X10(3) UL (ref 1–4)
LYMPHOCYTES NFR BLD AUTO: 35.1 %
MCH RBC QN AUTO: 28.3 PG (ref 26–34)
MCHC RBC AUTO-ENTMCNC: 31.9 G/DL (ref 31–37)
MCV RBC AUTO: 89 FL
MONOCYTES # BLD AUTO: 0.52 X10(3) UL (ref 0.1–1)
MONOCYTES NFR BLD AUTO: 7.5 %
NEUTROPHILS # BLD AUTO: 3.74 X10 (3) UL (ref 1.5–7.7)
NEUTROPHILS # BLD AUTO: 3.74 X10(3) UL (ref 1.5–7.7)
NEUTROPHILS NFR BLD AUTO: 54.4 %
OSMOLALITY SERPL CALC.SUM OF ELEC: 295 MOSM/KG (ref 275–295)
PLATELET # BLD AUTO: 260 10(3)UL (ref 150–450)
POTASSIUM SERPL-SCNC: 4.8 MMOL/L (ref 3.5–5.1)
PROT SERPL-MCNC: 7.3 G/DL (ref 5.7–8.2)
RBC # BLD AUTO: 4.8 X10(6)UL
SODIUM SERPL-SCNC: 142 MMOL/L (ref 136–145)
WBC # BLD AUTO: 6.9 X10(3) UL (ref 4–11)

## 2024-02-19 PROCEDURE — 83615 LACTATE (LD) (LDH) ENZYME: CPT

## 2024-02-19 PROCEDURE — 85025 COMPLETE CBC W/AUTO DIFF WBC: CPT

## 2024-02-19 PROCEDURE — 80053 COMPREHEN METABOLIC PANEL: CPT

## 2024-02-19 PROCEDURE — 85652 RBC SED RATE AUTOMATED: CPT

## 2024-02-19 PROCEDURE — 36415 COLL VENOUS BLD VENIPUNCTURE: CPT

## 2024-02-19 PROCEDURE — 99213 OFFICE O/P EST LOW 20 MIN: CPT | Performed by: INTERNAL MEDICINE

## 2024-02-19 RX ORDER — ROSUVASTATIN CALCIUM 20 MG/1
20 TABLET, COATED ORAL DAILY
COMMUNITY
Start: 2023-09-07

## 2024-02-19 NOTE — PROGRESS NOTES
[facility]   Roseline ISABEL Young is a 58 year old female here for f/u of   Encounter Diagnoses   Name Primary?    History of Hodgkin's disease Yes    Encounter for follow-up surveillance of Hodgkin's disease      She can't palpate any LN.    Has kept weight stable.     Denies pain or discomfort.     Denies B symptoms.     No fatigue.  Going for walks at work.    Performance status 0.     Review of Systems:   Review of Systems   Constitutional:  Negative for appetite change, diaphoresis, fatigue, fever and unexpected weight change.   HENT:   Negative for sore throat, trouble swallowing and voice change.         Hearing is good   Respiratory:  Negative for cough and shortness of breath.    Cardiovascular:  Negative for chest pain.   Gastrointestinal:  Negative for abdominal pain.   Musculoskeletal:  Positive for arthralgias (stiffness when first getting up.). Negative for back pain.   Skin:  Negative for rash.   Neurological:  Positive for numbness (toes, a little better.). Negative for dizziness and headaches.   Hematological:  Negative for adenopathy.   Psychiatric/Behavioral:  Negative for sleep disturbance.          Current Outpatient Medications   Medication Sig Dispense Refill    rosuvastatin 20 MG Oral Tab Take 1 tablet (20 mg total) by mouth daily.      SYNTHROID 88 MCG Oral Tab Take 1 tablet (88 mcg total) by mouth once daily.  3    CELECOXIB 200 MG Oral Cap TAKE 1 CAPSULE BY MOUTH DAILY AS NEEDED FOR PAIN. (Patient not taking: Reported on 2024) 30 capsule 0    cyclobenzaprine 5 MG Oral Tab Take 1 tablet (5 mg total) by mouth nightly. (Patient not taking: Reported on 2024) 30 tablet 1    Rosuvastatin Calcium 10 MG Oral Tab Take 10 mg by mouth nightly. (Patient not taking: Reported on 2024)       Allergies:   Allergies   Allergen Reactions    Adhesive Tape RASH       Past Medical History:   Diagnosis Date    High cholesterol     History of pregnancy         Hodgkin's disease, stage IIA  ([facility]) 10/13/2014     Past Surgical History:   Procedure Laterality Date           Social History     Socioeconomic History    Marital status:    Occupational History    Occupation: teacher   Tobacco Use    Smoking status: Never    Smokeless tobacco: Never   Substance and Sexual Activity    Alcohol use: No    Drug use: No   Other Topics Concern     Service Yes    Caffeine Concern Yes     Comment: Coffee    Occupational Exposure No       Family History   Problem Relation Age of Onset    Heart Disease Mother     Diabetes Mother     Stroke Father     Heart Disease Father         Coronary artery disease    Stroke Brother     Diabetes Brother          PHYSICAL EXAM:    /64 (BP Location: Left arm, Patient Position: Sitting, Cuff Size: adult)   Pulse 93   Temp 98 °F (36.7 °C) (Oral)   Resp 18   Ht 1.575 m (5' 2.01\")   Wt 81.5 kg (179 lb 11.2 oz)   SpO2 97%   BMI 32.86 kg/m²   Wt Readings from Last 6 Encounters:   24 81.5 kg (179 lb 11.2 oz)   23 83 kg (183 lb)   22 83 kg (183 lb)   21 83 kg (183 lb)   20 78.9 kg (174 lb)   19 75.2 kg (165 lb 12.8 oz)     Physical Exam  General: Patient is alert and oriented x 3, not in acute distress.  HEENT: EOMs intact. PERRL.   Neck: No JVD. No palpable lymphadenopathy. Neck is supple.  Chest: Clear to auscultation.  Heart: Regular rate and rhythm. + MADI  Abdomen: Soft, non tender with good bowel sounds.  Extremities: No edema.  Neurological: Grossly intact.   Lymphatics: There is no palpable lymphadenopathy throughout in the cervical, supraclavicular, axillary, or inguinal regions.  Psych/Depression: nl        ASSESSMENT/PLAN:     Encounter Diagnoses   Name Primary?    History of Hodgkin's disease Yes    Encounter for follow-up surveillance of Hodgkin's disease      Favorable stage IIA HD with CR after 4 cycles of ABVD.  Standard of care is for 4 cycles of chemotherapy with ABVD and if PET/CT showed favorable  response which it did, and completed ISRT in June 2015     On surveillance as per NCCN guidelines v 2.2015.      H+P every 3-6 for 1-2 y, then every 6-12 mo until year 3 (June 2018), then annually.    Labs as clinically indicated:  CBC, CMP and ESR if elevated at the beginning.      TSH yearly due RT to neck (first on June 2016 was hypothyroid, now on synthroid, will f/u per PCP). Current TSH normal, recommend evaluation by PCP for management of hypothyroid.    CT scan once during the first 12 months, then clinically indicated.  Done June 2016 with CARMINE.    Colonoscopy done August of 2021.    Mammograms per PCP yearly.    F/u in 1 yr with CBC, CMP, LDH and ESR.    All questions answered to the best of my abilities.  Support provided to the patient.      MDM low  No orders of the defined types were placed in this encounter.      Results From Past 48 Hours:  Recent Results (from the past 48 hour(s))   SED RATE [E]    Collection Time: 02/19/24  8:28 AM   Result Value Ref Range    Sed Rate 12 0 - 30 mm/Hr   LDH [E]    Collection Time: 02/19/24  8:28 AM   Result Value Ref Range     120 - 246 U/L   COMP METABOLIC PANEL [E]    Collection Time: 02/19/24  8:28 AM   Result Value Ref Range    Glucose 115 (H) 70 - 99 mg/dL    Sodium 142 136 - 145 mmol/L    Potassium 4.8 3.5 - 5.1 mmol/L    Chloride 106 98 - 112 mmol/L    CO2 30.0 21.0 - 32.0 mmol/L    Anion Gap 6 0 - 18 mmol/L    BUN 12 9 - 23 mg/dL    Creatinine 0.80 0.55 - 1.02 mg/dL    BUN/CREA Ratio 15.0 10.0 - 20.0    Calcium, Total 9.8 8.7 - 10.4 mg/dL    Calculated Osmolality 295 275 - 295 mOsm/kg    eGFR-Cr 85 >=60 mL/min/1.73m2    ALT 38 10 - 49 U/L    AST 25 <=34 U/L    Alkaline Phosphatase 67 46 - 118 U/L    Bilirubin, Total 0.5 0.3 - 1.2 mg/dL    Total Protein 7.3 5.7 - 8.2 g/dL    Albumin 4.4 3.2 - 4.8 g/dL    Globulin  2.9 2.8 - 4.4 g/dL    A/G Ratio 1.5 1.0 - 2.0    Patient Fasting for CMP? No    CBC W/ DIFFERENTIAL    Collection Time: 02/19/24  8:28 AM    Result Value Ref Range    WBC 6.9 4.0 - 11.0 x10(3) uL    RBC 4.80 3.80 - 5.30 x10(6)uL    HGB 13.6 12.0 - 16.0 g/dL    HCT 42.7 35.0 - 48.0 %    MCV 89.0 80.0 - 100.0 fL    MCH 28.3 26.0 - 34.0 pg    MCHC 31.9 31.0 - 37.0 g/dL    RDW-SD 41.5 35.1 - 46.3 fL    RDW 12.6 11.0 - 15.0 %    .0 150.0 - 450.0 10(3)uL    Neutrophil Absolute Prelim 3.74 1.50 - 7.70 x10 (3) uL    Neutrophil Absolute 3.74 1.50 - 7.70 x10(3) uL    Lymphocyte Absolute 2.42 1.00 - 4.00 x10(3) uL    Monocyte Absolute 0.52 0.10 - 1.00 x10(3) uL    Eosinophil Absolute 0.16 0.00 - 0.70 x10(3) uL    Basophil Absolute 0.04 0.00 - 0.20 x10(3) uL    Immature Granulocyte Absolute 0.01 0.00 - 1.00 x10(3) uL    Neutrophil % 54.4 %    Lymphocyte % 35.1 %    Monocyte % 7.5 %    Eosinophil % 2.3 %    Basophil % 0.6 %    Immature Granulocyte % 0.1 %       Imaging & Referrals:  None   No orders of the defined types were placed in this encounter.

## 2025-02-13 ENCOUNTER — TELEPHONE (OUTPATIENT)
Age: 60
End: 2025-02-13

## 2025-02-19 DIAGNOSIS — Z85.71 ENCOUNTER FOR FOLLOW-UP SURVEILLANCE OF HODGKIN'S DISEASE: ICD-10-CM

## 2025-02-19 DIAGNOSIS — Z85.71 HISTORY OF HODGKIN'S DISEASE: ICD-10-CM

## 2025-02-19 DIAGNOSIS — Z08 ENCOUNTER FOR FOLLOW-UP SURVEILLANCE OF HODGKIN'S DISEASE: ICD-10-CM

## 2025-02-20 ENCOUNTER — OFFICE VISIT (OUTPATIENT)
Age: 60
End: 2025-02-20
Attending: INTERNAL MEDICINE
Payer: COMMERCIAL

## 2025-02-20 ENCOUNTER — NURSE ONLY (OUTPATIENT)
Age: 60
End: 2025-02-20
Attending: INTERNAL MEDICINE
Payer: COMMERCIAL

## 2025-02-20 VITALS
BODY MASS INDEX: 31.8 KG/M2 | HEART RATE: 91 BPM | SYSTOLIC BLOOD PRESSURE: 133 MMHG | TEMPERATURE: 98 F | WEIGHT: 172.81 LBS | HEIGHT: 62 IN | OXYGEN SATURATION: 96 % | DIASTOLIC BLOOD PRESSURE: 84 MMHG | RESPIRATION RATE: 18 BRPM

## 2025-02-20 DIAGNOSIS — Z85.71 ENCOUNTER FOR FOLLOW-UP SURVEILLANCE OF HODGKIN'S DISEASE: ICD-10-CM

## 2025-02-20 DIAGNOSIS — Z08 ENCOUNTER FOR FOLLOW-UP SURVEILLANCE OF HODGKIN'S DISEASE: ICD-10-CM

## 2025-02-20 DIAGNOSIS — Z85.71 HISTORY OF HODGKIN'S DISEASE: Primary | ICD-10-CM

## 2025-02-20 DIAGNOSIS — Z85.71 HISTORY OF HODGKIN'S DISEASE: ICD-10-CM

## 2025-02-20 LAB
ALBUMIN SERPL-MCNC: 4.9 G/DL (ref 3.2–4.8)
ALBUMIN/GLOB SERPL: 1.8 {RATIO} (ref 1–2)
ALP LIVER SERPL-CCNC: 68 U/L
ALT SERPL-CCNC: 32 U/L
ANION GAP SERPL CALC-SCNC: 10 MMOL/L (ref 0–18)
AST SERPL-CCNC: 21 U/L (ref ?–34)
BASOPHILS # BLD AUTO: 0.05 X10(3) UL (ref 0–0.2)
BASOPHILS NFR BLD AUTO: 0.6 %
BILIRUB SERPL-MCNC: 0.5 MG/DL (ref 0.2–1.1)
BUN BLD-MCNC: 14 MG/DL (ref 9–23)
BUN/CREAT SERPL: 16.5 (ref 10–20)
CALCIUM BLD-MCNC: 9.8 MG/DL (ref 8.7–10.4)
CHLORIDE SERPL-SCNC: 101 MMOL/L (ref 98–112)
CO2 SERPL-SCNC: 29 MMOL/L (ref 21–32)
CREAT BLD-MCNC: 0.85 MG/DL
DEPRECATED RDW RBC AUTO: 41.5 FL (ref 35.1–46.3)
EGFRCR SERPLBLD CKD-EPI 2021: 78 ML/MIN/1.73M2 (ref 60–?)
EOSINOPHIL # BLD AUTO: 0.16 X10(3) UL (ref 0–0.7)
EOSINOPHIL NFR BLD AUTO: 2.1 %
ERYTHROCYTE [DISTWIDTH] IN BLOOD BY AUTOMATED COUNT: 12.6 % (ref 11–15)
ERYTHROCYTE [SEDIMENTATION RATE] IN BLOOD: 15 MM/HR
FASTING STATUS PATIENT QL REPORTED: NO
GLOBULIN PLAS-MCNC: 2.8 G/DL (ref 2–3.5)
GLUCOSE BLD-MCNC: 121 MG/DL (ref 70–99)
HCT VFR BLD AUTO: 46 %
HGB BLD-MCNC: 14.7 G/DL
IMM GRANULOCYTES # BLD AUTO: 0.01 X10(3) UL (ref 0–1)
IMM GRANULOCYTES NFR BLD: 0.1 %
LDH SERPL L TO P-CCNC: 195 U/L
LYMPHOCYTES # BLD AUTO: 2.2 X10(3) UL (ref 1–4)
LYMPHOCYTES NFR BLD AUTO: 28.2 %
MCH RBC QN AUTO: 28.3 PG (ref 26–34)
MCHC RBC AUTO-ENTMCNC: 32 G/DL (ref 31–37)
MCV RBC AUTO: 88.5 FL
MONOCYTES # BLD AUTO: 0.6 X10(3) UL (ref 0.1–1)
MONOCYTES NFR BLD AUTO: 7.7 %
NEUTROPHILS # BLD AUTO: 4.78 X10 (3) UL (ref 1.5–7.7)
NEUTROPHILS # BLD AUTO: 4.78 X10(3) UL (ref 1.5–7.7)
NEUTROPHILS NFR BLD AUTO: 61.3 %
OSMOLALITY SERPL CALC.SUM OF ELEC: 292 MOSM/KG (ref 275–295)
PLATELET # BLD AUTO: 259 10(3)UL (ref 150–450)
POTASSIUM SERPL-SCNC: 4.3 MMOL/L (ref 3.5–5.1)
PROT SERPL-MCNC: 7.7 G/DL (ref 5.7–8.2)
RBC # BLD AUTO: 5.2 X10(6)UL
SODIUM SERPL-SCNC: 140 MMOL/L (ref 136–145)
WBC # BLD AUTO: 7.8 X10(3) UL (ref 4–11)

## 2025-02-20 RX ORDER — ROSUVASTATIN CALCIUM 20 MG/1
30 TABLET, COATED ORAL NIGHTLY
COMMUNITY

## 2025-02-20 NOTE — PROGRESS NOTES
GAMALIEL Dukes ISABEL Young is a 59 year old female here for f/u of   Encounter Diagnoses   Name Primary?    History of Hodgkin's disease Yes    Encounter for follow-up surveillance of Hodgkin's disease      She can't palpate any LN.    Trying to loose weight.     Denies B symptoms.     No fatigue.  Going for walks at work.    Had blue light treatment to her face for precancerous skin lesions.      Performance status 0.     Review of Systems:   Review of Systems   Constitutional:  Negative for appetite change, diaphoresis, fatigue, fever and unexpected weight change.   HENT:   Negative for sore throat, trouble swallowing and voice change.    Respiratory:  Negative for cough and shortness of breath.    Cardiovascular:  Negative for chest pain.   Gastrointestinal:  Negative for abdominal pain.   Musculoskeletal:  Positive for arthralgias (stiffness when first getting up.). Negative for back pain and neck pain.   Skin:  Negative for rash.   Neurological:  Positive for dizziness (if bends down and gets up) and numbness (toes, a little better.). Negative for headaches.   Hematological:  Negative for adenopathy.   Psychiatric/Behavioral:  Negative for sleep disturbance.          Current Outpatient Medications   Medication Sig Dispense Refill    rosuvastatin 20 MG Oral Tab Take 1.5 tablets (30 mg total) by mouth nightly.      SYNTHROID 88 MCG Oral Tab Take 1 tablet (88 mcg total) by mouth once daily.  3     Allergies:   Allergies   Allergen Reactions    Adhesive Tape RASH       Past Medical History:    High cholesterol    History of pregnancy        Hodgkin's disease, stage IIA (HCC)     Past Surgical History:   Procedure Laterality Date           Social History     Socioeconomic History    Marital status:    Occupational History    Occupation: teacher   Tobacco Use    Smoking status: Never    Smokeless tobacco: Never   Substance and Sexual Activity    Alcohol use: No    Drug use: No   Other  Topics Concern     Service Yes    Caffeine Concern Yes     Comment: Coffee    Occupational Exposure No     Social Drivers of Health     Food Insecurity: Patient Declined (9/16/2024)    Received from [facility]    Hunger Vital Sign     Worried About Running Out of Food in the Last Year: Patient declined     Ran Out of Food in the Last Year: Patient declined   Transportation Needs: Patient Declined (9/16/2024)    Received from [facility]    PRAPARE - Transportation     Lack of Transportation (Medical): Patient declined     Lack of Transportation (Non-Medical): Patient declined   Housing Stability: Patient Declined (9/16/2024)    Received from [facility]    Housing Stability Vital Sign     Unable to Pay for Housing in the Last Year: Patient declined     Homeless in the Last Year: Patient declined       Family History   Problem Relation Age of Onset    Heart Disease Mother     Diabetes Mother     Stroke Father     Heart Disease Father         Coronary artery disease    Stroke Brother     Diabetes Brother          PHYSICAL EXAM:    /84 (BP Location: Left arm, Patient Position: Sitting, Cuff Size: adult)   Pulse 91   Temp 97.9 °F (36.6 °C) (Oral)   Resp 18   Ht 1.575 m (5' 2\")   Wt 78.4 kg (172 lb 12.8 oz)   SpO2 96%   BMI 31.61 kg/m²   Wt Readings from Last 6 Encounters:   02/20/25 78.4 kg (172 lb 12.8 oz)   02/19/24 81.5 kg (179 lb 11.2 oz)   02/09/23 83 kg (183 lb)   07/12/22 83 kg (183 lb)   07/19/21 83 kg (183 lb)   07/16/20 78.9 kg (174 lb)     Physical Exam  General: Patient is alert and oriented x 3, not in acute distress.  HEENT: EOMs intact. PERRL.  Facial erythema.  Neck: No JVD. No palpable lymphadenopathy. Neck is supple.  Chest: Clear to auscultation.  Heart: Regular rate and rhythm.   Abdomen: Soft, non tender with good bowel sounds.  Extremities: No edema.  Neurological: Grossly intact.   Lymphatics: There is no palpable  lymphadenopathy throughout in the cervical, supraclavicular, axillary, or inguinal regions.  Psych/Depression: nl        ASSESSMENT/PLAN:     Encounter Diagnoses   Name Primary?    History of Hodgkin's disease Yes    Encounter for follow-up surveillance of Hodgkin's disease      Favorable stage IIA HD with CR after 4 cycles of ABVD.  Standard of care is for 4 cycles of chemotherapy with ABVD and if PET/CT showed favorable response which it did, and completed ISRT in June 2015     On surveillance as per NCCN guidelines v 2.2015.      H+P every 3-6 for 1-2 y, then every 6-12 mo until year 3 (June 2018), then annually.    Labs as clinically indicated:  CBC, CMP and ESR if elevated at the beginning.      TSH yearly due RT to neck (first on June 2016 was hypothyroid, now on synthroid, will f/u per PCP). Current TSH normal, recommend evaluation by PCP for management of hypothyroid.    CT scan once during the first 12 months, then clinically indicated.  Done June 2016 with CARMINE.    Colonoscopy done August of 2021.    Mammograms per PCP yearly.    F/u in 1 yr with CBC, CMP, and ESR.    All questions answered to the best of my abilities.  Support provided to the patient.      MDM low  No orders of the defined types were placed in this encounter.      Results From Past 48 Hours:  Recent Results (from the past 48 hours)   CBC With Differential With Platelet    Collection Time: 02/20/25  7:58 AM   Result Value Ref Range    WBC 7.8 4.0 - 11.0 x10(3) uL    RBC 5.20 3.80 - 5.30 x10(6)uL    HGB 14.7 12.0 - 16.0 g/dL    HCT 46.0 35.0 - 48.0 %    MCV 88.5 80.0 - 100.0 fL    MCH 28.3 26.0 - 34.0 pg    MCHC 32.0 31.0 - 37.0 g/dL    RDW-SD 41.5 35.1 - 46.3 fL    RDW 12.6 11.0 - 15.0 %    .0 150.0 - 450.0 10(3)uL    Neutrophil Absolute Prelim 4.78 1.50 - 7.70 x10 (3) uL    Neutrophil Absolute 4.78 1.50 - 7.70 x10(3) uL    Lymphocyte Absolute 2.20 1.00 - 4.00 x10(3) uL    Monocyte Absolute 0.60 0.10 - 1.00 x10(3) uL    Eosinophil  Absolute 0.16 0.00 - 0.70 x10(3) uL    Basophil Absolute 0.05 0.00 - 0.20 x10(3) uL    Immature Granulocyte Absolute 0.01 0.00 - 1.00 x10(3) uL    Neutrophil % 61.3 %    Lymphocyte % 28.2 %    Monocyte % 7.7 %    Eosinophil % 2.1 %    Basophil % 0.6 %    Immature Granulocyte % 0.1 %   Comp Metabolic Panel (14)    Collection Time: 02/20/25  7:58 AM   Result Value Ref Range    Glucose 121 (H) 70 - 99 mg/dL    Sodium 140 136 - 145 mmol/L    Potassium 4.3 3.5 - 5.1 mmol/L    Chloride 101 98 - 112 mmol/L    CO2 29.0 21.0 - 32.0 mmol/L    Anion Gap 10 0 - 18 mmol/L    BUN 14 9 - 23 mg/dL    Creatinine 0.85 0.55 - 1.02 mg/dL    BUN/CREA Ratio 16.5 10.0 - 20.0    Calcium, Total 9.8 8.7 - 10.4 mg/dL    Calculated Osmolality 292 275 - 295 mOsm/kg    eGFR-Cr 78 >=60 mL/min/1.73m2    ALT 32 10 - 49 U/L    AST 21 <34 U/L    Alkaline Phosphatase 68 46 - 118 U/L    Bilirubin, Total 0.5 0.2 - 1.1 mg/dL    Total Protein 7.7 5.7 - 8.2 g/dL    Albumin 4.9 (H) 3.2 - 4.8 g/dL    Globulin  2.8 2.0 - 3.5 g/dL    A/G Ratio 1.8 1.0 - 2.0    Patient Fasting for CMP? No    LDH    Collection Time: 02/20/25  7:58 AM   Result Value Ref Range     120 - 246 U/L   Sed Rate, Westergren (Automated)    Collection Time: 02/20/25  7:58 AM   Result Value Ref Range    Sed Rate 15 0 - 30 mm/Hr       Imaging & Referrals:  None   No orders of the defined types were placed in this encounter.

## (undated) NOTE — LETTER
12/28/2017              [address] [facility] [address]         Dear Mary Garcia should wear comfortable walking shoes at work, including athletic shoes.   This will decrease the chance of having a flare